# Patient Record
Sex: MALE | Race: BLACK OR AFRICAN AMERICAN
[De-identification: names, ages, dates, MRNs, and addresses within clinical notes are randomized per-mention and may not be internally consistent; named-entity substitution may affect disease eponyms.]

---
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
## 2022-02-01 ENCOUNTER — HOSPITAL ENCOUNTER (EMERGENCY)
Dept: HOSPITAL 97 - ER | Age: 22
Discharge: HOME | End: 2022-02-01
Payer: COMMERCIAL

## 2022-02-01 VITALS — DIASTOLIC BLOOD PRESSURE: 64 MMHG | SYSTOLIC BLOOD PRESSURE: 107 MMHG | OXYGEN SATURATION: 99 %

## 2022-02-01 VITALS — TEMPERATURE: 98.6 F
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
## 2022-02-01 DIAGNOSIS — S61.216A: Primary | ICD-10-CM
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
## 2022-02-01 DIAGNOSIS — Z23: ICD-10-CM

## 2022-02-01 PROCEDURE — 90714 TD VACC NO PRESV 7 YRS+ IM: CPT

## 2022-02-01 PROCEDURE — 90471 IMMUNIZATION ADMIN: CPT

## 2022-02-01 PROCEDURE — 99283 EMERGENCY DEPT VISIT LOW MDM: CPT

## 2022-02-01 PROCEDURE — 0JQJ0ZZ REPAIR RIGHT HAND SUBCUTANEOUS TISSUE AND FASCIA, OPEN APPROACH: ICD-10-PCS

## 2022-02-01 NOTE — EDPHYS
Physician Documentation                                                                           

 [facility]                                                                 

Name: Nehemiah Arora                                                                             

Age: 21 yrs                                                                                       

Sex: Male                                                                                         

: 2000                                                                                   

MRN: [id number]                                                                                   

Arrival Date: 2022                                                                          

Time: 13:58                                                                                       

Account#: [id number]                                                                            

Bed Treatment                                                                                     

Private MD:                                                                                       

ED Physician Christophe Dalal                                                                         

HPI:                                                                                              

                                                                                             

15:00 This 21 yrs old Black Male presents to ER via Ambulatory with complaints of Finger      cp  

      Injury - Finger lac.                                                                        

15:00 The patient or guardian reports a laceration.                                           cp  

15:00 The complaints affect the right small finger.                                           cp  

15:00 Context: The problem was sustained at work, resulted from broken glass.                 cp  

15:00 Onset: The symptoms/episode began/occurred today. Associated signs and symptoms:        cp  

      Pertinent negatives: cyanosis distally, decreased sensation distally.                       

                                                                                                  

Historical:                                                                                       

- Allergies:                                                                                      

14:12 No Known Allergies;                                                                     ld1 

- Home Meds:                                                                                      

14:12 None [Active];                                                                          ld1 

- PMHx:                                                                                           

14:12 None;                                                                                   ld1 

- PSHx:                                                                                           

14:12 None;                                                                                   ld1 

                                                                                                  

- Immunization history:: Adult Immunizations up to date, Client reports having NOT                

  received the Covid vaccine.                                                                     

- Social history:: Smoking status: Reported history of juuling and/or vaping. Patient             

  uses alcohol, occasionally.                                                                     

                                                                                                  

                                                                                                  

ROS:                                                                                              

15:05 Constitutional: Negative for body aches, chills, fever.                                 cp  

15:05 Cardiovascular: Negative for chest pain, palpitations.                                  cp  

15:05 Respiratory: Negative for cough, shortness of breath, wheezing.                             

15:05 Abdomen/GI: Negative for abdominal pain, nausea, vomiting, and diarrhea.                    

15:05 Skin: Positive for laceration(s), of the right fifth finger.                                

15:05 Neuro: Negative for numbness, tingling.                                                     

15:05 All other systems are negative.                                                             

                                                                                                  

Exam:                                                                                             

15:10 Constitutional: The patient appears in no acute distress, alert, awake, well developed, cp  

      well nourished.                                                                             

15:10 Head/Face:  Normocephalic, atraumatic.                                                  cp  

15:10 Cardiovascular: Rate: normal.                                                               

15:10 Respiratory: the patient does not display signs of respiratory distress,  Respirations:     

      normal.                                                                                     

15:10 Musculoskeletal/extremity: exam of right small finger shows no tendon injury, full ROM.     

15:10 Skin: injury, laceration(s), the wound is approximately  3.5 cm(s), of the ulna side        

      middle and proximal phalanx right small finger, that can be described as clean, no          

      foreign body, linear, without bleeding.                                                     

                                                                                                  

Vital Signs:                                                                                      

14:11  / 71; Pulse 92; Resp 18; Temp 98.6(O); Pulse Ox 98% on R/A; Weight 71.67 kg;     ld1 

      Height 6 ft. 1 in. (185.42 cm); Pain 0/10;                                                  

17:40  / 64; Pulse 54; Resp 18; Pulse Ox 99% on R/A;                                    ww  

14:11 Body Mass Index 20.85 (71.67 kg, 185.42 cm)                                             ld1 

                                                                                                  

Laceration:                                                                                       

17:05 Wound Repair of 3.5cm ( 1.4in ) subcutaneous laceration to radial side right fifth      cp  

      finger. Linear shaped.. Distal neuro/vascular/tendon intact. Anesthesia: Wound              

      infiltrated with 4 mls of 1% lidocaine. Wound prep: Moderate cleansing by me, Wound         

      irrigation by me. Skin closed with 6 4-0 Prolene using interrupted sutures and sterile      

      technique. Dressed with Bacitracin, 4x4's. Patient tolerated well.                          

                                                                                                  

MDM:                                                                                              

14:49 Patient medically screened.                                                             cp  

17:08 Data reviewed: vital signs, nurses notes.                                               cp  

17:08 Differential diagnosis: open fracture, tendon injury, simple laceration. Counseling: I  cp  

      had a detailed discussion with the patient and/or guardian regarding: the historical        

      points, exam findings, and any diagnostic results supporting the discharge/admit            

      diagnosis, the need for outpatient follow up, a family practitioner, to return to the       

      emergency department if symptoms worsen or persist or if there are any questions or         

      concerns that arise at home. Response to treatment: the patient's symptoms have             

      markedly improved after treatment, and as a result, I will discharge patient.               

                                                                                                  

                                                                                             

14:50 Order name: Dressing - Wound; Complete Time: 14:57                                      cp  

                                                                                             

14:50 Order name: Gloves, Sterile; Complete Time: 14:57                                       cp  

                                                                                             

14:50 Order name: Setup Suture Tray; Complete Time: 14:58                                     cp  

                                                                                             

14:50 Order name: Wound Care: please clean and irrigate wound; Complete Time: 14:57           cp  

                                                                                             

17:05 Order name: Wound dressing; Complete Time: 17:40                                        cp  

                                                                                             

17:05 Order name: Finger Splint; Complete Time: 17:40                                         cp  

                                                                                                  

Administered Medications:                                                                         

15:05 Drug: Tetanus-Diphtheria Toxoid Adult 0.5 ml {: Probity. Exp:         ww  

      2023. Lot #: [id number]. } Route: IM; Site: left deltoid;                                  

15:35 Drug: Lidocaine (1 %) 10 ml {Note: given via ER MD; [facility].} Volume: 20 ml; Route:       ww  

      Infiltration;                                                                               

                                                                                                  

                                                                                                  

Disposition:                                                                                      

17:15 Chart complete.                                                                         cp  

                                                                                                  

Disposition Summary:                                                                              

22 17:08                                                                                    

Discharge Ordered                                                                                 

      Location: Home                                                                          cp  

      Problem: new                                                                            cp  

      Symptoms: have improved                                                                 cp  

      Condition: Stable                                                                       cp  

      Diagnosis                                                                                   

        - Laceration without foreign body of right little finger without damage to nail,      cp  

      initial encounter                                                                           

      Followup:                                                                               cp  

        - With: Private Physician                                                                  

        - When: 10 - 14 days                                                                       

        - Reason: Staple/Suture removal                                                            

      Discharge Instructions:                                                                     

        - Discharge Summary Sheet                                                             cp  

        - Laceration Care, Adult                                                              cp  

        - Sutured Wound Care                                                                  cp  

      Forms:                                                                                      

        - Medication Reconciliation Form                                                      cp  

        - Thank You Letter                                                                    cp  

        - Antibiotic Education                                                                cp  

        - Prescription Opioid Use                                                             cp  

Addendum:                                                                                         

2022                                                                                        

     07:07 Co-signature as Attending Physician, Christophe Dalal MD I agree with the assessment and     r
n

           plan of care. Attestation: The patient's history, exam findings, diagnostics, and a    

           summary of any interventions or procedures was reviewed in detail with Roman RED.  

                                                                                                  

Signatures:                                                                                       

Christophe Dalal MD MD rn Page, Corey, PA PA   cp                                                   

Lisa Gutierrez RN                     RN   ld1                                                  

Sharlene Lezama RN                       RN   ww                                                   

                                                                                                  

Corrections: (The following items were deleted from the chart)                                    

                                                                                             

17:09 17:05 Wound Repair of 3.5cm ( 1.4in ) subcutaneous laceration to radial side right      cp  

      fourth finger. Linear shaped.. Distal neuro/vascular/tendon intact. Anesthesia: Wound       

      infiltrated with 4 mls of 1% lidocaine. Wound prep: Moderate cleansing by me, Wound         

      irrigation by me. Skin closed with 6 4-0 Prolene using interrupted sutures and sterile      

      technique. Dressed with Bacitracin, 4x4's. Patient tolerated well. cp                       

                                                                                             

17:26  15:10 Skin: injury, laceration(s), the wound is approximately 4 cm(s), of the     cp  

      ulna side middle and proximal phalanx right small finger, that can be described as          

      clean, no foreign body, linear, without bleeding, cp                                        

                                                                                                  

**************************************************************************************************

## 2022-02-01 NOTE — ER
Nurse's Notes                                                                                     

 [facility]                                                                 

Name: Nehemiah Arora                                                                             

Age: 21 yrs                                                                                       

Sex: Male                                                                                         

: 2000                                                                                   

MRN: [id number]                                                                                   

Arrival Date: 2022                                                                          

Time: 13:58                                                                                       

Account#: [id number]                                                                            

Bed Treatment                                                                                     

Private MD:                                                                                       

Diagnosis: Laceration without foreign body of right little finger without damage to nail, initial 

  encounter                                                                                       

                                                                                                  

Presentation:                                                                                     

                                                                                             

14:11 Chief complaint: Patient states: Finger laceration to right pinky - Rack of cups        ld1 

      slipped out of hand - broken glass cut finger. Coronavirus screen: At this time, the        

      client does not indicate any symptoms associated with coronavirus-19. Ebola Screen: No      

      symptoms or risks identified at this time. Initial Sepsis Screen: Does the patient meet     

      any 2 criteria? No. Patient's initial sepsis screen is negative. Does the patient have      

      a suspected source of infection? No. Patient's initial sepsis screen is negative. Risk      

      Assessment: Do you want to hurt yourself or someone else? Patient reports no desire to      

      harm self or others. Onset of symptoms was 2022.                               

14:11 Method Of Arrival: Ambulatory                                                           ld1 

14:11 Acuity: JOEL 4                                                                           ld1 

                                                                                                  

Triage Assessment:                                                                                

14:12 General: Appears in no apparent distress. comfortable, Behavior is calm, cooperative,   ld1 

      appropriate for age. Pain: Denies pain. Neuro: Level of Consciousness is awake, alert,      

      obeys commands, Oriented to person, place, time, situation. Respiratory: Airway is          

      patent Respiratory effort is even, unlabored. Musculoskeletal: No signs and/or symptoms     

      reported regarding the musculoskeletal system. Injury Description: Laceration sustained     

      to dorsal aspect of distal phalanx of right little finger, dorsal aspect of middle          

      phalanx of right little finger and dorsal aspect of proximal phalanx of right little        

      finger.                                                                                     

                                                                                                  

Historical:                                                                                       

- Allergies:                                                                                      

14:12 No Known Allergies;                                                                     ld1 

- Home Meds:                                                                                      

14:12 None [Active];                                                                          ld1 

- PMHx:                                                                                           

14:12 None;                                                                                   ld1 

- PSHx:                                                                                           

14:12 None;                                                                                   ld1 

                                                                                                  

- Immunization history:: Adult Immunizations up to date, Client reports having NOT                

  received the Covid vaccine.                                                                     

- Social history:: Smoking status: Reported history of juuling and/or vaping. Patient             

  uses alcohol, occasionally.                                                                     

                                                                                                  

                                                                                                  

Screenin:41 Abuse screen: Denies threats or abuse. Denies injuries from another. Nutritional        ww  

      screening: No deficits noted. Tuberculosis screening: No symptoms or risk factors           

      identified. Fall Risk None identified.                                                      

                                                                                                  

Assessment:                                                                                       

14:30 General: Appears in no apparent distress. comfortable, Behavior is calm, cooperative.   ww  

      Pain: Complains of pain in dorsal aspect of middle phalanx of right little finger,          

      dorsal aspect of proximal phalanx of right little finger, palmar aspect of middle           

      phalanx of right little finger and palmar aspect of proximal phalanx of right little        

      finger. Neuro: Level of Consciousness is awake, alert, obeys commands, Oriented to          

      person, place, time, situation, Speech is normal. Cardiovascular: Capillary refill < 3      

      seconds Patient's skin is warm and dry. Respiratory: Airway is patent Respiratory           

      effort is even, unlabored, Respiratory pattern is regular, symmetrical. GI: No deficits     

      noted. : No deficits noted. Derm: Skin is healthy with good turgor. Injury                

      Description: Laceration sustained to dorsal aspect of middle phalanx of right little        

      finger, dorsal aspect of proximal phalanx of right little finger, palmar aspect of          

      middle phalanx of right little finger and palmar aspect of proximal phalanx of right        

      little finger.                                                                              

15:15 Reassessment: Patient appears in no apparent distress at this time. No changes from     ww  

      previously documented assessment. Patient and/or family updated on plan of care and         

      expected duration. Pain level reassessed. Patient is alert, oriented x 3, equal             

      unlabored respirations, skin warm/dry/pink.                                                 

16:26 Reassessment: Patient appears in no apparent distress at this time. No changes from     ww  

      previously documented assessment. Patient and/or family updated on plan of care and         

      expected duration. Pain level reassessed. Patient is alert, oriented x 3, equal             

      unlabored respirations, skin warm/dry/pink.                                                 

                                                                                                  

Vital Signs:                                                                                      

14:11  / 71; Pulse 92; Resp 18; Temp 98.6(O); Pulse Ox 98% on R/A; Weight 71.67 kg;     ld1 

      Height 6 ft. 1 in. (185.42 cm); Pain 0/10;                                                  

17:40  / 64; Pulse 54; Resp 18; Pulse Ox 99% on R/A;                                    ww  

14:11 Body Mass Index 20.85 (71.67 kg, 185.42 cm)                                             ld1 

                                                                                                  

ED Course:                                                                                        

13:58 Patient arrived in ED.                                                                  ds1 

14:11 Arm band placed on right wrist.                                                         ld1 

14:12 Triage completed.                                                                       ld1 

14:45 [facility]                                                                cp  

14:46 Christophe Dalal MD is Attending Physician.                                                cp  

14:51 Sharlene Lezama, RN is Primary Nurse.                                                     ww  

17:41 Patient has correct armband on for positive identification. Bed in low position. Call   ww  

      light in reach. Side rails up X 1.                                                          

17:41 No provider procedures requiring assistance completed. Patient did not have IV access   ww  

      during this emergency room visit.                                                           

                                                                                                  

Administered Medications:                                                                         

15:05 Drug: Tetanus-Diphtheria Toxoid Adult 0.5 ml {: [employer]. Exp:         ww  

      2023. Lot #: [id number]. } Route: IM; Site: left deltoid;                                  

15:35 Drug: Lidocaine (1 %) 10 ml {Note: given via ER MD; MERLIN.} Volume: 20 ml; Route:       ww  

      Infiltration;                                                                               

                                                                                                  

                                                                                                  

Outcome:                                                                                          

17:08 Discharge ordered by MD.                                                                cp  

17:40 Discharged to home ambulatory.                                                          ww  

17:40 Condition: stable                                                                           

17:40 Discharge instructions given to patient, Instructed on discharge instructions, follow       

      up and referral plans. medication usage, safety practices, wound care, Demonstrated         

      understanding of instructions, follow-up care, medications.                                 

17:41 Patient left the ED.                                                                    ww  

                                                                                                  

Signatures:                                                                                       

Fany Toth                                ds1                                                  

Roman Thomas PA PA   cp                                                   

Lisa Gutierrez, RN                     RN   ld1                                                  

Sharlene Lezama, RN                       RN   ww                                                   

                                                                                                  

Corrections: (The following items were deleted from the chart)                                    

14:13 14:11 Pulse 92bpm; Resp 18bpm; Pulse Ox 98% RA; Temp 98.6F Oral; 71.67 kg; Height 6 ft. ld1 

      1 in.; BMI: 20.8; Pain 0/10; ld1                                                            

                                                                                                  

**************************************************************************************************

## 2022-02-18 ENCOUNTER — HOSPITAL ENCOUNTER (EMERGENCY)
Dept: HOSPITAL 97 - ER | Age: 22
Discharge: HOME | End: 2022-02-18
Payer: COMMERCIAL

## 2022-02-18 VITALS — OXYGEN SATURATION: 97 % | SYSTOLIC BLOOD PRESSURE: 131 MMHG | DIASTOLIC BLOOD PRESSURE: 78 MMHG | TEMPERATURE: 98.7 F

## 2022-02-18 DIAGNOSIS — Z48.02: Primary | ICD-10-CM

## 2022-02-18 PROCEDURE — 99281 EMR DPT VST MAYX REQ PHY/QHP: CPT

## 2022-02-18 NOTE — EDPHYS
Physician Documentation                                                                           

 [facility]                                                                 

Name: Nehemiah Arora                                                                             

Age: 21 yrs                                                                                       

Sex: Male                                                                                         

: 2000                                                                                   

MRN: [id number]                                                                                   

Arrival Date: 2022                                                                          

Time: 17:28                                                                                       

Account#: [id number]                                                                            

Bed 12                                                                                            

Private MD:                                                                                       

ED Physician Christophe Dalal                                                                         

HPI:                                                                                              

                                                                                             

17:51 This 21 yrs old Black Male presents to ER via Ambulatory with complaints of Suture      kb  

      Removal.                                                                                    

17:51 The patient has sutures on the dorsal aspect of proximal phalanx of right little        kb  

      finger. Previous treatment: The patient was initially treated on 2022.          

      Sutures/staples progress: The patient has no c/o's. The wound is well-healing with no       

      redness, swelling, discharge, or dehiscence reported. The patient has not experienced       

      similar symptoms in the past. The patient has been recently seen by a physician:.           

                                                                                                  

Historical:                                                                                       

- Allergies:                                                                                      

17:34 No Known Allergies;                                                                     ld1 

- Home Meds:                                                                                      

17:34 None [Active];                                                                          ld1 

- PMHx:                                                                                           

17:34 None;                                                                                   ld1 

- PSHx:                                                                                           

17:34 None;                                                                                   ld1 

                                                                                                  

- Immunization history:: Adult Immunizations up to date, Client reports having NOT                

  received the Covid vaccine.                                                                     

- Social history:: Smoking status: Reported history of juuling and/or vaping.                     

  Patient/guardian denies using alcohol.                                                          

                                                                                                  

                                                                                                  

ROS:                                                                                              

17:52 Constitutional: Negative for fever, chills, and weight loss.                            kb  

17:52 Skin: Positive for sutures in place.                                                        

17:52 All other systems are negative.                                                             

                                                                                                  

Exam:                                                                                             

17:52 Constitutional:  This is a well developed, well nourished patient who is awake, alert,  kb  

      and in no acute distress. Head/Face:  Normocephalic, atraumatic. ENT:  Moist Mucous         

      membranes Respiratory:  Respirations even and unlabored. No increased work of               

      breathing. Talking in full sentences MS/ Extremity:  Pulses equal, no cyanosis.             

      Neurovascular intact.  Full, normal range of motion. Neuro:  Awake and alert, GCS 15,       

      oriented to person, place, time, and situation. Moves all extremities. Normal gait.         

      Psych:  Awake, alert, with orientation to person, place and time.  Behavior, mood, and      

      affect are within normal limits.                                                            

17:52 Skin: Wound recheck: Suture laceration closure: the wound is healing well, the edges        

      are well approximated, no evidence of dehiscence, no drainage, no erythema, no swelling.    

                                                                                                  

Vital Signs:                                                                                      

17:33  / 78; Pulse 55; Resp 18; Temp 98.7(TE); Pulse Ox 97% on R/A; Weight 71.67 kg;    ld1 

      Height 6 ft. 2 in. (187.96 cm); Pain 0/10;                                                  

17:33 Body Mass Index 20.29 (71.67 kg, 187.96 cm)                                             ld1 

                                                                                                  

Procedures:                                                                                       

17:52 Suture/Staple removal: Removed 6 sutures, from dorsal aspect of proximal phalanx of     kb  

      right little finger, site appears well healed, Patient tolerated well.                      

                                                                                                  

MDM:                                                                                              

17:45 Patient medically screened.                                                             kb  

17:52 Data reviewed: vital signs, nurses notes. Data interpreted: Pulse oximetry: on room air kb  

      is 97 %. Interpretation: normal. Counseling: I had a detailed discussion with the           

      patient and/or guardian regarding: the historical points, exam findings, and any            

      diagnostic results supporting the discharge/admit diagnosis, the need for outpatient        

      follow up, a family practitioner, to return to the emergency department if symptoms         

      worsen or persist or if there are any questions or concerns that arise at home.             

                                                                                                  

Administered Medications:                                                                         

No medications were administered                                                                  

                                                                                                  

                                                                                                  

Disposition:                                                                                      

18:47 Co-signature as Attending Physician, Christophe Dalal MD I agree with the assessment and     rn  

      plan of care. Attestation: The patient's history, exam findings, diagnostics, and a         

      summary of any interventions or procedures was reviewed in detail with Lynda SHAH.                                                                                      

                                                                                                  

Disposition Summary:                                                                              

22 17:53                                                                                    

Discharge Ordered                                                                                 

      Location: Home                                                                            

      Condition: Stable                                                                       kb  

      Diagnosis                                                                                   

        - Encounter for removal of sutures                                                    kb  

      Followup:                                                                               kb  

        - With: Emergency Department                                                               

        - When: As needed                                                                          

        - Reason: Worsening of condition                                                           

      Followup:                                                                               kb  

        - With: Private Physician                                                                  

        - When: 2 - 3 days                                                                         

        - Reason: Recheck today's complaints, Continuance of care, Re-evaluation by your           

      physician                                                                                   

      Discharge Instructions:                                                                     

        - Discharge Summary Sheet                                                             kb  

        - Suture Removal, Care After                                                          kb  

      Forms:                                                                                      

        - Medication Reconciliation Form                                                      kb  

        - Thank You Letter                                                                    kb  

        - Antibiotic Education                                                                kb  

        - Work release form                                                                   kb  

        - Prescription Opioid Use                                                             kb  

Signatures:                                                                                       

Lynda Jeong FNP-C FNP-Christophe Rothman MD MD rn Dibbern, Lauren, RN                     RN   ld1                                                  

                                                                                                  

**************************************************************************************************

## 2022-02-18 NOTE — ER
Nurse's Notes                                                                                     

 [facility]                                                                 

Name: Nehemiah Arora                                                                             

Age: 21 yrs                                                                                       

Sex: Male                                                                                         

: 2000                                                                                   

MRN: [id number]                                                                                   

Arrival Date: 2022                                                                          

Time: 17:28                                                                                       

Account#: [id number]                                                                            

Bed 12                                                                                            

Private MD:                                                                                       

Diagnosis: Encounter for removal of sutures                                                       

                                                                                                  

Presentation:                                                                                     

                                                                                             

17:33 Chief complaint: Patient states: Suture removal in right pinky finger. Coronavirus      ld1 

      screen: At this time, the client does not indicate any symptoms associated with             

      coronavirus-19. Ebola Screen: No symptoms or risks identified at this time. Initial         

      Sepsis Screen: Does the patient meet any 2 criteria? No. Patient's initial sepsis           

      screen is negative. Does the patient have a suspected source of infection? No.              

      Patient's initial sepsis screen is negative. Risk Assessment: Do you want to hurt           

      yourself or someone else? Patient reports no desire to harm self or others. Onset of        

      symptoms was 2022.                                                             

17:33 Method Of Arrival: Ambulatory                                                           ld1 

17:33 Acuity: JOEL 4                                                                           ld1 

                                                                                                  

Triage Assessment:                                                                                

17:34 General: Appears in no apparent distress. comfortable, Behavior is calm, cooperative,   ld1 

      appropriate for age. Pain: Denies pain. Neuro: Level of Consciousness is awake, alert,      

      obeys commands, Oriented to person, place, time, situation. Respiratory: Airway is          

      patent Respiratory effort is even, unlabored. Derm:.                                        

17:36 General: Appears in no apparent distress. comfortable, Behavior is calm, cooperative.   lr4 

      Pain: Denies pain. Neuro: No deficits noted. Cardiovascular: No deficits noted.             

      Respiratory: No deficits noted. Injury Description: Sutures noted to R hand posterior       

      5th digit. healing well.                                                                    

                                                                                                  

Historical:                                                                                       

- Allergies:                                                                                      

17:34 No Known Allergies;                                                                     ld1 

- Home Meds:                                                                                      

17:34 None [Active];                                                                          ld1 

- PMHx:                                                                                           

17:34 None;                                                                                   ld1 

- PSHx:                                                                                           

17:34 None;                                                                                   ld1 

                                                                                                  

- Immunization history:: Adult Immunizations up to date, Client reports having NOT                

  received the Covid vaccine.                                                                     

- Social history:: Smoking status: Reported history of juuling and/or vaping.                     

  Patient/guardian denies using alcohol.                                                          

                                                                                                  

                                                                                                  

Screenin:38 Abuse screen: Denies threats or abuse.                                                  lr4 

17:38 Fall Risk None identified.                                                              lr4 

17:38 Nutritional screening: No deficits noted. Tuberculosis screening: No symptoms or risk   lr4 

      factors identified.                                                                         

                                                                                                  

Vital Signs:                                                                                      

17:33  / 78; Pulse 55; Resp 18; Temp 98.7(TE); Pulse Ox 97% on R/A; Weight 71.67 kg;    ld1 

      Height 6 ft. 2 in. (187.96 cm); Pain 0/10;                                                  

17:33 Body Mass Index 20.29 (71.67 kg, 187.96 cm)                                             ld1 

                                                                                                  

ED Course:                                                                                        

17:28 Patient arrived in ED.                                                                  as  

17:34 Triage completed.                                                                       ld1 

17:34 Arm band placed on. Arm band placed on right wrist.                                     ld1 

17:38 Patient has correct armband on for positive identification. Call light in reach. Adult  lr4 

      w/ patient. Door closed.                                                                    

17:38 No provider procedures requiring assistance completed.                                  lr4 

17:38 Patient did not have IV access during this emergency room visit.                        lr4 

17:45 [facility]                                                        kb  

17:45 Christophe Dalal MD is Attending Physician.                                                kb  

                                                                                                  

Administered Medications:                                                                         

No medications were administered                                                                  

                                                                                                  

                                                                                                  

Outcome:                                                                                          

17:38 Condition: good                                                                         lr4 

17:39 Discharged to home                                                                      lr4 

17:39 Discharge instructions given to patient.                                                lr4 

17:53 Discharge ordered by MD.                                                                kb  

18:06 Patient left the ED.                                                                    lr4 

                                                                                                  

Signatures:                                                                                       

Lynda Jeong FNP-C FNP-Ckb Martinez, Amelia as Dibbern, Lauren, RN                     RN   ld1                                                  

Dorita Cruz RN                   RN   lr4                                                  

                                                                                                  

**************************************************************************************************

## 2022-07-07 ENCOUNTER — HOSPITAL ENCOUNTER (EMERGENCY)
Dept: HOSPITAL 97 - ER | Age: 22
Discharge: HOME | End: 2022-07-07
Payer: COMMERCIAL

## 2022-07-07 VITALS — DIASTOLIC BLOOD PRESSURE: 61 MMHG | SYSTOLIC BLOOD PRESSURE: 115 MMHG | OXYGEN SATURATION: 100 % | TEMPERATURE: 98.6 F

## 2022-07-07 DIAGNOSIS — R11.10: ICD-10-CM

## 2022-07-07 DIAGNOSIS — I86.1: Primary | ICD-10-CM

## 2022-07-07 LAB
ALBUMIN SERPL BCP-MCNC: 4.4 G/DL (ref 3.4–5)
ALP SERPL-CCNC: 73 U/L (ref 45–117)
ALT SERPL W P-5'-P-CCNC: 16 U/L (ref 12–78)
AST SERPL W P-5'-P-CCNC: 9 U/L (ref 15–37)
BUN BLD-MCNC: 9 MG/DL (ref 7–18)
GLUCOSE SERPLBLD-MCNC: 80 MG/DL (ref 74–106)
HCT VFR BLD CALC: 41.2 % (ref 39.6–49)
LIPASE SERPL-CCNC: 30 U/L (ref 73–393)
LYMPHOCYTES # SPEC AUTO: 0.9 K/UL (ref 0.7–4.9)
MCV RBC: 93.1 FL (ref 80–100)
PMV BLD: 10.5 FL (ref 7.6–11.3)
POTASSIUM SERPL-SCNC: 3.9 MMOL/L (ref 3.5–5.1)
RBC # BLD: 4.43 M/UL (ref 4.33–5.43)

## 2022-07-07 PROCEDURE — 80053 COMPREHEN METABOLIC PANEL: CPT

## 2022-07-07 PROCEDURE — 87086 URINE CULTURE/COLONY COUNT: CPT

## 2022-07-07 PROCEDURE — 36415 COLL VENOUS BLD VENIPUNCTURE: CPT

## 2022-07-07 PROCEDURE — 74176 CT ABD & PELVIS W/O CONTRAST: CPT

## 2022-07-07 PROCEDURE — 83690 ASSAY OF LIPASE: CPT

## 2022-07-07 PROCEDURE — 81003 URINALYSIS AUTO W/O SCOPE: CPT

## 2022-07-07 PROCEDURE — 76377 3D RENDER W/INTRP POSTPROCES: CPT

## 2022-07-07 PROCEDURE — 87590 N.GONORRHOEAE DNA DIR PROB: CPT

## 2022-07-07 PROCEDURE — 87088 URINE BACTERIA CULTURE: CPT

## 2022-07-07 PROCEDURE — 76870 US EXAM SCROTUM: CPT

## 2022-07-07 PROCEDURE — 85025 COMPLETE CBC W/AUTO DIFF WBC: CPT

## 2022-07-07 PROCEDURE — 87490 CHLMYD TRACH DNA DIR PROBE: CPT

## 2022-07-07 NOTE — ER
Nurse's Notes                                                                                     

 [facility]                                                                 

Name: Nehemiah Arora                                                                             

Age: 22 yrs                                                                                       

Sex: Male                                                                                         

: 2000                                                                                   

MRN: [id number]                                                                                   

Arrival Date: 2022                                                                          

Time: 10:51                                                                                       

Account#: [id number]                                                                            

Bed 12                                                                                            

Private MD:                                                                                       

Diagnosis: Vomiting;Scrotal varices                                                               

                                                                                                  

Presentation:                                                                                     

                                                                                             

11:33 Chief complaint: Patient states: Pain that began to abd than is now radiating to        ss  

      testicles and groin area. + N/V. Coronavirus screen: Client denies travel out of the        

      [place] in the last 14 days. Ebola Screen: Patient denies exposure to infectious person.       

      Patient denies travel to an Ebola-affected area in the 21 days before illness onset.        

      Initial Sepsis Screen: Does the patient meet any 2 criteria? No. Patient's initial          

      sepsis screen is negative. Does the patient have a suspected source of infection? No.       

      Patient's initial sepsis screen is negative. Risk Assessment: Do you want to hurt           

      yourself or someone else? Patient reports no desire to harm self or others. Onset of        

      symptoms was 2022.                                                                 

11:33 Method Of Arrival: Ambulatory                                                           ss  

11:33 Acuity: JOEL 3                                                                           ss  

                                                                                                  

Triage Assessment:                                                                                

13:38 General: Appears in no apparent distress. Behavior is calm, cooperative, appropriate    ap3 

      for age. Pain: Complains of pain in pelvis. Neuro: Level of Consciousness is awake,         

      alert, obeys commands, Oriented to person, place, time, situation, Gait is steady,          

      Speech is normal. Cardiovascular: Patient's skin is warm and dry. Respiratory: Airway       

      is patent Respiratory effort is even, unlabored, Respiratory pattern is regular,            

      symmetrical.                                                                                

                                                                                                  

Historical:                                                                                       

- Allergies:                                                                                      

11:36 No Known Allergies;                                                                     ss  

- Home Meds:                                                                                      

11:36 None [Active];                                                                          ss  

- PMHx:                                                                                           

11:36 None;                                                                                   ss  

- PSHx:                                                                                           

11:36 L wrist;                                                                                ss  

                                                                                                  

- Immunization history:: Client reports receiving the 2nd dose of the Covid vaccine.              

- Social history:: Smoking status: Reported history of juuling and/or vaping.                     

                                                                                                  

                                                                                                  

Screenin:37 Abuse screen: Denies threats or abuse. Nutritional screening: No deficits noted.        ap3 

      Tuberculosis screening: No symptoms or risk factors identified. Fall Risk None              

      identified.                                                                                 

                                                                                                  

Assessment:                                                                                       

13:36 Reassessment: patient provided with urine specimen container and education on proper    ap3 

      urine collection. patient verbalized understanding on proper collection technique.          

                                                                                                  

Vital Signs:                                                                                      

11:33  / 61; Pulse 64; Resp 16; Temp 98.6(TE); Pulse Ox 100% on R/A; Pain 9/10;         ss  

                                                                                                  

ED Course:                                                                                        

10:51 Patient arrived in ED.                                                                  rg4 

10:55 Danie Araujo PA is PHCP.                                                              jmm 

10:55 Bear Rose DO is Attending Physician.                                                jmm 

11:36 Triage completed.                                                                       ss  

11:36 Arm band placed on right wrist.                                                         ss  

12:07 Inserted saline lock: 20 gauge in right antecubital area, using aseptic technique.      zm  

      Blood collected.                                                                            

12:07 CBC with Diff Sent.                                                                     zm  

12:07 CMP Sent.                                                                               zm  

12:07 Lipase Sent.                                                                            zm  

12:18 CT Stone Protocol In Process Unspecified.                                               EDMS

13:01 US Scrotum Testicles In Process Unspecified.                                            EDMS

13:30 Nita Kolb, RN is Primary Nurse.                                                    ss  

13:38 Patient has correct armband on for positive identification. Bed in low position. Call   ap3 

      light in reach. Side rails up X 1. Adult w/ patient. Pulse ox on. NIBP on. Door closed.     

      Noise minimized. Warm blanket given.                                                        

15:20 Jeromy Wheeler MD is Referral Physician.                                              m 

15:41 No provider procedures requiring assistance completed. Patient did not have IV access   ap3 

      during this emergency room visit.                                                           

                                                                                                  

Administered Medications:                                                                         

14:19 Drug: Rocephin (cefTRIAXone) 1 grams Route: IV; Rate: calculated rate; Site: right      ap3 

      antecubital;                                                                                

15:43 Follow up: IV Status: Completed infusion; IV Intake: 100ml                              ap3 

14:19 Drug: AZITHromycin 1 grams Route: PO;                                                   ap3 

15:50 Follow up: Response: No adverse reaction                                                ss  

                                                                                                  

                                                                                                  

Medication:                                                                                       

15:41 VIS not applicable for this client.                                                     ap3 

                                                                                                  

Intake:                                                                                           

15:43 IV: 100ml; Total: 100ml.                                                                ap3 

                                                                                                  

Outcome:                                                                                          

15:22 Discharge ordered by MD.                                                                jmm 

15:41 Discharged to home ambulatory.                                                          ap3 

15:41 Condition: good                                                                             

15:41 Discharge instructions given to patient, Instructed on discharge instructions, follow       

      up and referral plans. medication usage, Demonstrated understanding of instructions,        

      follow-up care, medications, Prescriptions given X 1.                                       

15:42 Patient left the ED.                                                                    ap3 

                                                                                                  

Signatures:                                                                                       

Dispatcher [employer]                           Danie Fernandez PA PA jmm Smirch, Shelby, LUCHO                      RN   ss                                                   

Madeline Mann4                                                  

Melissa Lopez RN                    RN   ap3                                                  

Yancy Ji                                                   

                                                                                                  

**************************************************************************************************

## 2022-07-07 NOTE — EDPHYS
Physician Documentation                                                                           

 [facility]                                                                 

Name: Nehemiah Arora                                                                             

Age: 22 yrs                                                                                       

Sex: Male                                                                                         

: 2000                                                                                   

MRN: [id number]                                                                                   

Arrival Date: 2022                                                                          

Time: 10:51                                                                                       

Account#: [id number]                                                                            

Bed 12                                                                                            

Private MD:                                                                                       

ED Physician Bear Rose                                                                         

HPI:                                                                                              

                                                                                             

11:36 This 22 yrs old Black Male presents to ER via Ambulatory with complaints of Groin Pain. jmm 

11:36 The patient presents with scrotal pain. Onset: The symptoms/episode began/occurred      jmm 

      gradually. Modifying factors: The symptoms are alleviated by nothing, the symptoms are      

      aggravated by nothing.                                                                      

13:59 Associated signs and symptoms: Pertinent positives: abdominal pain. The patient has not jmm 

      experienced similar symptoms in the past.                                                   

                                                                                                  

Historical:                                                                                       

- Allergies:                                                                                      

11:36 No Known Allergies;                                                                     ss  

- Home Meds:                                                                                      

11:36 None [Active];                                                                          ss  

- PMHx:                                                                                           

11:36 None;                                                                                   ss  

- PSHx:                                                                                           

11:36 L wrist;                                                                                ss  

                                                                                                  

- Immunization history:: Client reports receiving the 2nd dose of the Covid vaccine.              

- Social history:: Smoking status: Reported history of juuling and/or vaping.                     

                                                                                                  

                                                                                                  

ROS:                                                                                              

13:59 Constitutional: Negative for fever, chills, and weight loss, Cardiovascular: Negative   jmm 

      for chest pain, palpitations, and edema, Respiratory: Negative for shortness of breath,     

      cough, wheezing, and pleuritic chest pain.                                                  

13:59 Abdomen/GI: Positive for abdominal pain.                                                    

13:59 All other systems are negative.                                                             

                                                                                                  

Exam:                                                                                             

13:59 Constitutional:  This is a well developed, well nourished patient who is awake, alert,  jmm 

      and in no acute distress. Head/Face:  atraumatic. Eyes:  EOMI, no conjunctival erythema     

      appreciated ENT:  Moist Mucus Membranes Neck:  Trachea midline, Supple Chest/axilla:        

      Normal chest wall appearance and motion.   Cardiovascular:  Regular rate and rhythm.        

      No edema appreciated Respiratory:  Normal respirations, no respiratory distress             

      appreciated Abdomen/GI:  Non distended Back:  Normal ROM Skin:  General appearance          

      color normal MS/ Extremity:  Moves all extremities, no obvious deformities appreciated,     

      no edema noted to the lower extremities  Neuro:  Awake and alert Psych:  Behavior is        

      normal, Mood is normal, Patient is cooperative and pleasant                                 

                                                                                                  

Vital Signs:                                                                                      

11:33  / 61; Pulse 64; Resp 16; Temp 98.6(TE); Pulse Ox 100% on R/A; Pain 9/10;         ss  

                                                                                                  

MDM:                                                                                              

11:36 Patient medically screened.                                                             [facility] 

15:19 Data reviewed: vital signs, nurses notes.                                               [facility] 

15:20 Counseling: I had a detailed discussion with the patient and/or guardian regarding: the [facility] 

      historical points, exam findings, and any diagnostic results supporting the                 

      discharge/admit diagnosis, lab results, radiology results, the need for outpatient          

      follow up, to return to the emergency department if symptoms worsen or persist or if        

      there are any questions or concerns that arise at home.                                     

                                                                                                  

                                                                                             

11:37 Order name: CBC with Diff; Complete Time: 12:26                                         [facility] 

                                                                                             

11:37 Order name: CMP; Complete Time: 12:29                                                   [facility] 

                                                                                             

11:37 Order name: Lipase; Complete Time: 12:29                                                [facility] 

                                                                                             

13:55 Order name: Urine Dipstick-Ancillary; Complete Time: 13:56                              EDMS

                                                                                             

14:00 Order name: Urine Culture                                                                 

                                                                                             

11:37 Order name: IV Saline Lock; Complete Time: 12:07                                        [facility] 

                                                                                             

11:37 Order name: Labs collected and sent; Complete Time: 12:07                               [facility] 

                                                                                             

11:37 Order name: Urine Dipstick-Ancillary (obtain specimen); Complete Time: 14:00            [facility] 

                                                                                             

11:53 Order name: CT Stone Protocol; Complete Time: 12:54                                     [facility] 

                                                                                             

12:27 Order name: US Scrotum Testicles; Complete Time: 13:18                                  [facility] 

                                                                                             

14:04 Order name: GC (Anderson/Chl) Probe URINE                                                    EDMS

                                                                                                  

Administered Medications:                                                                         

14:19 Drug: Rocephin (cefTRIAXone) 1 grams Route: IV; Rate: calculated rate; Site: right      ap3 

      antecubital;                                                                                

15:43 Follow up: IV Status: Completed infusion; IV Intake: 100ml                              ap3 

14:19 Drug: AZITHromycin 1 grams Route: PO;                                                   ap3 

15:50 Follow up: Response: No adverse reaction                                                ss  

                                                                                                  

                                                                                                  

Disposition:                                                                                      

20:00 Co-signature as Attending Physician, Bear EID was immediately available on-site ms3 

      in the Emergency Department for consultation in the care of the patient..                   

                                                                                                  

Disposition Summary:                                                                              

22 15:22                                                                                    

Discharge Ordered                                                                                 

      Location: Home                                                                          [facility] 

      Condition: Stable                                                                       jmm 

      Diagnosis                                                                                   

        - Vomiting                                                                            jmm 

        - Scrotal varices                                                                     jm 

      Followup:                                                                               [facility] 

        - With: Jeromy Wheeler MD                                                                

        - When: 2 - 3 days                                                                         

        - Reason: Recheck today's complaints, Continuance of care, Re-evaluation by your           

      physician                                                                                   

      Discharge Instructions:                                                                     

        - Discharge Summary Sheet                                                             jmm 

        - Varicocele                                                                          jmm 

        - Vomiting, Adult                                                                     jmm 

      Forms:                                                                                      

        - Medication Reconciliation Form                                                      jm 

        - Thank You Letter                                                                    jmm 

        - Antibiotic Education                                                                jmm 

        - Prescription Opioid Use                                                             [facility] 

        - Work release form                                                                   ap3 

      Prescriptions:                                                                              

        - ondansetron 4 mg Oral tablet,disintegrating                                              

            - take 1 tablet by ORAL route every 4-6 hours; 20 tablet; Refills: 0, Product     jm 

      Selection Permitted                                                                         

Signatures:                                                                                       

Dispatcher [employer]                           EDMS                                                 

Danie Araujo PA                       PA   ajaym                                                  

Nita Kolb RN                      RN   ss                                                   

Melissa Lopez RN                    RN   ap3                                                  

Bear Rose DO DO   ms3                                                  

                                                                                                  

Corrections: (The following items were deleted from the chart)                                    

14:16 13:51 GC (Gonorr/Clamydia) Probe+R.LAB.BRZ ordered. EDMS                                EDMS

                                                                                                  

**************************************************************************************************

## 2022-07-07 NOTE — RAD REPORT
EXAM DESCRIPTION:  US - Scrotum Testicles - 7/7/2022 1:00 pm

 

CLINICAL HISTORY:  Testicular pain

 

COMPARISON:  None

 

FINDINGS:  Right testicle measures 4 x 1.9 x 3.3 centimeters. Echotexture is homogeneous. Normal bloo
d flow

 

Left testicle measures 3.7 x 2.1 x 2.2 centimeters. Echotexture is homogeneous. Normal blood flow

 

The epididymides are normal in size and echotexture. Normal blood flow is seen.

 

Right varicocele

 

Small lymph nodes probably reactive

 

IMPRESSION:  Right varicocele